# Patient Record
Sex: MALE | Race: OTHER | HISPANIC OR LATINO | URBAN - METROPOLITAN AREA
[De-identification: names, ages, dates, MRNs, and addresses within clinical notes are randomized per-mention and may not be internally consistent; named-entity substitution may affect disease eponyms.]

---

## 2017-12-21 ENCOUNTER — EMERGENCY (EMERGENCY)
Facility: HOSPITAL | Age: 52
LOS: 1 days | Discharge: HOME | End: 2017-12-21
Attending: EMERGENCY MEDICINE
Payer: MEDICAID

## 2017-12-21 VITALS
SYSTOLIC BLOOD PRESSURE: 139 MMHG | TEMPERATURE: 98 F | OXYGEN SATURATION: 99 % | DIASTOLIC BLOOD PRESSURE: 92 MMHG | HEART RATE: 90 BPM | RESPIRATION RATE: 18 BRPM

## 2017-12-21 VITALS — HEIGHT: 74 IN | WEIGHT: 214.95 LBS

## 2017-12-21 PROCEDURE — 72100 X-RAY EXAM L-S SPINE 2/3 VWS: CPT | Mod: 26

## 2017-12-21 PROCEDURE — 72100 X-RAY EXAM L-S SPINE 2/3 VWS: CPT

## 2017-12-21 PROCEDURE — 99284 EMERGENCY DEPT VISIT MOD MDM: CPT

## 2017-12-21 PROCEDURE — 99283 EMERGENCY DEPT VISIT LOW MDM: CPT | Mod: 25

## 2017-12-21 PROCEDURE — 96372 THER/PROPH/DIAG INJ SC/IM: CPT

## 2017-12-21 RX ORDER — DIAZEPAM 5 MG
2 TABLET ORAL ONCE
Qty: 0 | Refills: 0 | Status: DISCONTINUED | OUTPATIENT
Start: 2017-12-21 | End: 2017-12-21

## 2017-12-21 RX ORDER — METHOCARBAMOL 500 MG/1
1 TABLET, FILM COATED ORAL
Qty: 30 | Refills: 0 | OUTPATIENT
Start: 2017-12-21 | End: 2017-12-30

## 2017-12-21 RX ORDER — IBUPROFEN 200 MG
1 TABLET ORAL
Qty: 40 | Refills: 0 | OUTPATIENT
Start: 2017-12-21 | End: 2017-12-30

## 2017-12-21 RX ORDER — KETOROLAC TROMETHAMINE 30 MG/ML
30 SYRINGE (ML) INJECTION ONCE
Qty: 0 | Refills: 0 | Status: DISCONTINUED | OUTPATIENT
Start: 2017-12-21 | End: 2017-12-21

## 2017-12-21 RX ADMIN — Medication 2 MILLIGRAM(S): at 14:21

## 2017-12-21 RX ADMIN — Medication 30 MILLIGRAM(S): at 14:22

## 2017-12-21 RX ADMIN — Medication 30 MILLIGRAM(S): at 15:00

## 2017-12-21 NOTE — ED PROVIDER NOTE - CONDUCTED A DETAILED DISCUSSION WITH PATIENT AND/OR GUARDIAN REGARDING, MDM
need for outpatient follow-up need for outpatient follow-up/radiology results/return to ED if symptoms worsen, persist or questions arise

## 2017-12-21 NOTE — ED PROVIDER NOTE - MEDICAL DECISION MAKING DETAILS
Plan: pain control, XRs, and reassess. Plan: pain control, XRs, and reassess.fu pmd/physical theraphy

## 2017-12-21 NOTE — ED PROVIDER NOTE - OBJECTIVE STATEMENT
51 y/o M pt w/ no significant PMHx, able to ambulate, presents to ED c/o lower back pain x 1 week, 9/10 in severity. Pt reports he has seen his PMD, but his symptoms haven't improved, so pt came in for evaluation. 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 Pt denies any fever or any other complaints. NKDA.

## 2023-01-25 ENCOUNTER — EMERGENCY (EMERGENCY)
Facility: HOSPITAL | Age: 58
LOS: 1 days | Discharge: HOME | End: 2023-01-25
Attending: EMERGENCY MEDICINE
Payer: MEDICAID

## 2023-01-25 VITALS
HEART RATE: 81 BPM | SYSTOLIC BLOOD PRESSURE: 137 MMHG | WEIGHT: 213.85 LBS | RESPIRATION RATE: 19 BRPM | DIASTOLIC BLOOD PRESSURE: 86 MMHG | OXYGEN SATURATION: 98 % | TEMPERATURE: 98 F

## 2023-01-25 LAB
RAPID RVP RESULT: SIGNIFICANT CHANGE UP
SARS-COV-2 RNA SPEC QL NAA+PROBE: SIGNIFICANT CHANGE UP

## 2023-01-25 PROCEDURE — 71046 X-RAY EXAM CHEST 2 VIEWS: CPT | Mod: 26

## 2023-01-25 PROCEDURE — 99283 EMERGENCY DEPT VISIT LOW MDM: CPT | Mod: 25

## 2023-01-25 PROCEDURE — 99284 EMERGENCY DEPT VISIT MOD MDM: CPT

## 2023-01-25 PROCEDURE — 0225U NFCT DS DNA&RNA 21 SARSCOV2: CPT

## 2023-01-25 PROCEDURE — 71046 X-RAY EXAM CHEST 2 VIEWS: CPT

## 2023-01-25 RX ORDER — AZITHROMYCIN 500 MG/1
1 TABLET, FILM COATED ORAL
Qty: 4 | Refills: 0
Start: 2023-01-25 | End: 2023-01-28

## 2023-01-25 RX ORDER — AZITHROMYCIN 500 MG/1
500 TABLET, FILM COATED ORAL ONCE
Refills: 0 | Status: COMPLETED | OUTPATIENT
Start: 2023-01-25 | End: 2023-01-25

## 2023-01-25 RX ADMIN — AZITHROMYCIN 500 MILLIGRAM(S): 500 TABLET, FILM COATED ORAL at 17:56

## 2023-01-25 NOTE — ED PROVIDER NOTE - CLINICAL SUMMARY MEDICAL DECISION MAKING FREE TEXT BOX
57-year-old male, presents for evaluation of cough x3 weeks.  Since yesterday felt subjective fever and cough became productive.  Well-appearing, vital signs within normal limits, afebrile.  Unremarkable lung exam.  Given duration of symptoms we will do chest x-ray to rule out pneumonia and RVP swab. 57-year-old male, presents for evaluation of cough x3 weeks.  Since yesterday felt subjective fever and cough became productive.  Well-appearing, vital signs within normal limits, afebrile.  Unremarkable lung exam.  Given duration of symptoms we will do chest x-ray to rule out pneumonia and RVP swab.    Chest x-ray negative for pneumonia.  History and findings consistent with acute bronchitis.  Given duration of symptoms will treat with azithromycin.  Patient will need to follow-up with PMD in 2 to 3 days.

## 2023-01-25 NOTE — ED PROVIDER NOTE - NSFOLLOWUPINSTRUCTIONS_ED_ALL_ED_FT
Follow up with the primary care doctor in 2-3 days.  Air humidifier at night.  If you experience any new or worsening symptoms or if you are concerned you can always come back to the emergency for a re-evaluation.  If there were any prescriptions given to you during the visit today take them as prescribed. If you have any questions you can ask the pharmacist.

## 2023-01-25 NOTE — ED PROVIDER NOTE - PATIENT PORTAL LINK FT
You can access the FollowMyHealth Patient Portal offered by [facility] by registering at the following website: [email or web address]. By joining [employer]’s FollowMyHealth portal, you will also be able to view your health information using other applications (apps) compatible with our system.

## 2023-01-25 NOTE — ED PROVIDER NOTE - DISCHARGE DATE
DULERA 200 MCG/5 MCG INHALER         Last Written Prescription Date: 9/22/17  Last Fill Quantity: 3, # refills: 0    Last Office Visit with G, P or [facility] prescribing provider:  9/5/17   Future Office Visit:    Next 5 appointments (look out 90 days)     Oct 18, 2017  2:00 PM CDT   Return Visit with Brodie Cassidy MD   [facility] ([facility])    [facility]  [address]  [facility] [id number]   [phone]                   Date of Last Asthma Action Plan Letter:   Asthma Action Plan Q1 Year    Topic Date Due     Asthma Action Plan - yearly  08/24/2017      Asthma Control Test:   ACT Total Scores 4/28/2017   ACT TOTAL SCORE -   ASTHMA ER VISITS -   ASTHMA HOSPITALIZATIONS -   ACT TOTAL SCORE (Goal Greater than or Equal to 20) 24   In the past 12 months, how many times did you visit the emergency room for your asthma without being admitted to the hospital? 0   In the past 12 months, how many times were you hospitalized overnight because of your asthma? 0       Date of Last Spirometry Test:   No results found for this or any previous visit.             25-Jan-2023

## 2023-01-25 NOTE — ED PROVIDER NOTE - ATTENDING APP SHARED VISIT CONTRIBUTION OF CARE
Agree with NP H&P.  57-year-old male presents for cough x3 weeks.  Patient reporting subjective fever and cough with sputum production.  Concern for possible infectious process.  Will check cxr, RVP swab and reassess.

## 2023-01-25 NOTE — ED PROVIDER NOTE - OBJECTIVE STATEMENT
57-year-old male, history of MAIDA, hypertension, presents for evaluation of cough x3 weeks.  Gradual onset of nonproductive cough that is progressively getting worse.  Cough was dry up until yesterday where he had some mild whitish-yellowish sputum expectoration.  Associated with subjective fever last night.  Also reports some mild sore throat.  Denies any night sweats, shortness of breath, chest pain or any other complaints.  Patient is a non-smoker.  Patient's mother is with similar symptoms of cough.

## 2023-07-16 NOTE — ED PROVIDER NOTE - NS_ATTENDINGSCRIBE_ED_ALL_ED
[facility]
I personally performed the service described in the documentation recorded by the scribe in my presence, and it accurately and completely records my words and actions.

## 2024-04-01 ENCOUNTER — EMERGENCY (EMERGENCY)
Facility: HOSPITAL | Age: 59
LOS: 1 days | Discharge: HOME | End: 2024-04-01
Attending: STUDENT IN AN ORGANIZED HEALTH CARE EDUCATION/TRAINING PROGRAM
Payer: SELF-PAY

## 2024-04-01 VITALS
RESPIRATION RATE: 18 BRPM | HEART RATE: 77 BPM | OXYGEN SATURATION: 98 % | SYSTOLIC BLOOD PRESSURE: 138 MMHG | DIASTOLIC BLOOD PRESSURE: 88 MMHG

## 2024-04-01 VITALS
WEIGHT: 218.04 LBS | OXYGEN SATURATION: 99 % | HEART RATE: 98 BPM | RESPIRATION RATE: 17 BRPM | DIASTOLIC BLOOD PRESSURE: 95 MMHG | HEIGHT: 73 IN | SYSTOLIC BLOOD PRESSURE: 153 MMHG | TEMPERATURE: 98 F

## 2024-04-01 PROBLEM — I10 ESSENTIAL (PRIMARY) HYPERTENSION: Chronic | Status: ACTIVE | Noted: 2023-01-25

## 2024-04-01 PROBLEM — G47.33 OBSTRUCTIVE SLEEP APNEA (ADULT) (PEDIATRIC): Chronic | Status: ACTIVE | Noted: 2023-01-25

## 2024-04-01 PROCEDURE — 99282 EMERGENCY DEPT VISIT SF MDM: CPT

## 2024-04-01 PROCEDURE — 99283 EMERGENCY DEPT VISIT LOW MDM: CPT

## 2024-04-01 NOTE — ED PROVIDER NOTE - CLINICAL SUMMARY MEDICAL DECISION MAKING FREE TEXT BOX
59-year-old male with past medical history of HTN presenting to the ED for evaluation of redness in his right eye.  As per patient, he states that he was rubbing his eyes yesterday and when looking in the mirror this morning at work he noted redness to the right eye.  Patient states that he does not have any pain to the eye nor change in any visual acuity, headache, fever, chills, nausea, vomiting, and any additional complaints at this time.  On physical exam, there is a subconjunctival hemorrhage in the right eye.  Pupils are equal reactive to light.  Visual acuity intact.  No foreign body visualized. No further intervention needed.

## 2024-04-01 NOTE — ED PROVIDER NOTE - NSFOLLOWUPINSTRUCTIONS_ED_ALL_ED_FT
Por favor jeremiah matteo concetta con munguia doctor primario entre matteo a dos semanas.     Vuelva al departmento de emergencia si jolynn sintomas emperan.     Hemorragia subconjuntival  Subconjunctival Hemorrhage  La hemorragia subconjuntival es el sangrado que se produce entre la parte radha del re (esclerótica) y la membrana transparente que recubre la parte externa de fifi órgano (conjuntiva). Cerca de la superficie del re hay muchos vasos sanguíneos diminutos. La hemorragia subconjuntival ocurre cuando jessica o más de estos vasos sanguíneos se rompen y sangran, lo que deriva en la aparición de matteo lawanda erna en el re. Esta es similar a un moretón.    En función de la magnitud del sangrado, la lawanda erna puede cubrir únicamente matteo pequeña rashid del re o la totalidad de la parte visible de la esclerótica. Si se acumula mucha dolores debajo de la conjuntiva, también puede arquel inflamación. Las hemorragias subconjuntivales no afectan la visión ni causan dolor, alysa puede raquel sensación de irritación ocular si hay inflamación. En general, las hemorragias subconjuntivales no requieren tratamiento y, usualmente, desaparecen solas en el término de dos a cuatro semanas.    ¿Cuáles son las causas?  Esta afección puede ser causada por lo siguiente:  Un traumatismo leve, tan frotarse los ojos con mucha fuerza.  Lesiones por contusiones, tan por ejemplo practicar deportes o tener contacto con un airbag desplegado.  Toser, estornudar o vomitar.  Realizar esfuerzos, tan ocurre al levantar un objeto pesado.  Trastornos médicos tales tan:  Presión arterial renetta.  Diabetes.  Cirugías recientes del re.  Algunos medicamentos, especialmente los anticoagulantes, incluida la aspirina.  Otras afecciones, tan los tumores en los ojos, los trastornos hemorrágicos o las anomalías de los vasos sanguíneos.  Las hemorragias subconjuntivales también pueden producirse sin matteo causa aparente.    ¿Cuáles son los signos o síntomas?  Eyes showing the white of the left eye completely red.  Los síntomas de esta afección incluyen:  Matteo lawanda de color simeon oscuro o brillante en la parte radha del re. La rashid erna puede:  Extenderse hasta cubrir matteo rashid más krystyna del re antes de desaparecer.  Cambiar de color, tan cayla o amarillo amarronado, antes de desaparecer.  Hinchazón alrededor del re.  Irritación leve del re.  ¿Cómo se diagnostica?  Esta afección se diagnostica mediante un examen físico. Si la hemorragia subconjuntival fue causada por un traumatismo, el médico puede derivarlo a un oculista (oftalmólogo) o a otro especialista para que lo examinen en busca de otras lesiones. Pueden hacerle otras pruebas, por ejemplo:  Un examen ocular que incluye matteo prueba de visión, matteo revisión ocular con un tipo de microscopio (lámpara de hendidura) y la medición de la presión ocular. Es posible que se le dilate el re, especialmente si la hemorragia subconjuntival fue causada por un traumatismo.  Un control de la presión arterial.  Análisis de dolores para detectar la presencia de trastornos hemorrágicos.  Si la hemorragia subconjuntival fue causada por un traumatismo, pueden hacerle radiografías o matteo exploración por tomografía computarizada (TC) para determinar si hay otras lesiones.    ¿Cómo se trata?  Generalmente, no se requiere un tratamiento para esta afección. Si tiene molestias, el médico puede recomendarle que se aplique gotas oftálmicas o compresas frías.    Siga estas instrucciones en munguia casa:  Caro los medicamentos de venta anju y los recetados solamente tan se lo haya indicado el médico.  Aplique las gotas oftálmicas o las compresas frías para aliviar las molestias tan se lo haya indicado el médico.  Evite las actividades, las cosas y los entornos que pueden causarle irritación o lesiones en el re.  Concurra a todas las visitas de seguimiento. [place] es importante.  Comuníquese con un médico si:  Siente dolor en el re.  El sangrado no desaparece en el término de 4 semanas.  Sigue teniendo nuevas hemorragias subconjuntivales.  Solicite ayuda de inmediato si:  Tiene cambios en la visión, dificultad para sanaz o visión doble.  Repentinamente, tiene mucha sensibilidad a la nadeem.  Tiene dolor de janeth intenso, vómitos persistentes, confusión o un cansancio que no es normal (letargo).  Parece que el re sobresale o se protruye de la órbita.  Le aparecen moretones en el cuerpo sin motivo.  Tiene sangrado en otra parte del cuerpo sin motivo.  Estos síntomas pueden representar un problema grave que constituye matteo emergencia. No espere a sanaz si los síntomas desaparecen. Solicite atención médica de inmediato. Comuníquese con el servicio de emergencias de munguia localidad (911 en los [place]). No conduzca por jolynn propios medios hasta el hospital.    Resumen  La hemorragia subconjuntival es el sangrado que se produce entre la parte radha del re y la membrana transparente que recubre la parte externa de fifi órgano.  Esta afección es similar a un kyleigh.  En general, las hemorragias subconjuntivales no requieren tratamiento y, usualmente, desaparecen solas en el término de dos a cuatro semanas.  Aplique las gotas oftálmicas o las compresas frías para aliviar las molestias tan se lo haya indicado el médico.  Esta información no tiene tan fin reemplazar el consejo del médico. Asegúrese de hacerle al médico cualquier pregunta que tenga.

## 2024-04-01 NOTE — ED PROVIDER NOTE - PHYSICAL EXAMINATION
Constitutional: Well-appearing, well-nourished, comfortable appearing.   Head: Normocephalic, atraumatic.   Eyes: PERRL. EOMI. No conjunctival pallor. There is subconjunctival hemorrhage noted to the lateral aspect of the right eye. Visual acuity with glasses is ***.   ENT: Moist mucous membranes. Uvula midline.   Neck: No LAD. Supple.  CVS: Regular rate, regular rhythm.   Respiratory: No respiratory distress. CTAB.   Abdomen: Abd is soft and non-distended.   Neuro: Alert and oriented to person, place, and time. Follows commands. Moves all extremities.   Skin: Warm and dry. No rashes.   Psych: Normal affect, cooperative. Constitutional: Well-appearing, well-nourished, comfortable appearing.   Head: Normocephalic, atraumatic.   Eyes: PERRL. EOMI. No conjunctival pallor. There is subconjunctival hemorrhage noted to the lateral aspect of the right eye. Visual acuity with glasses is 20/20 in bilateral eyes.   ENT: Moist mucous membranes. Uvula midline.   Neck: No LAD. Supple.  CVS: Regular rate, regular rhythm.   Respiratory: No respiratory distress. CTAB.   Abdomen: Abd is soft and non-distended.   Neuro: Alert and oriented to person, place, and time. Follows commands. Moves all extremities.   Skin: Warm and dry. No rashes.   Psych: Normal affect, cooperative.

## 2024-04-01 NOTE — ED PROVIDER NOTE - PATIENT PORTAL LINK FT
You can access the FollowMyHealth Patient Portal offered by [facility] by registering at the following website: [email or web address]. By joining [employer]’s FollowMyHealth portal, you will also be able to view your health information using other applications (apps) compatible with our system.
